# Patient Record
Sex: FEMALE | Race: WHITE | NOT HISPANIC OR LATINO | URBAN - METROPOLITAN AREA
[De-identification: names, ages, dates, MRNs, and addresses within clinical notes are randomized per-mention and may not be internally consistent; named-entity substitution may affect disease eponyms.]

---

## 2022-03-23 ENCOUNTER — APPOINTMENT (OUTPATIENT)
Dept: URBAN - METROPOLITAN AREA SURGERY 18 | Age: 42
Setting detail: DERMATOLOGY
End: 2022-03-28

## 2022-03-23 DIAGNOSIS — L73.8 OTHER SPECIFIED FOLLICULAR DISORDERS: ICD-10-CM

## 2022-03-23 DIAGNOSIS — L81.0 POSTINFLAMMATORY HYPERPIGMENTATION: ICD-10-CM

## 2022-03-23 DIAGNOSIS — L90.5 SCAR CONDITIONS AND FIBROSIS OF SKIN: ICD-10-CM

## 2022-03-23 DIAGNOSIS — Z71.89 OTHER SPECIFIED COUNSELING: ICD-10-CM

## 2022-03-23 PROCEDURE — OTHER REFERRAL: OTHER

## 2022-03-23 PROCEDURE — 99203 OFFICE O/P NEW LOW 30 MIN: CPT

## 2022-03-23 PROCEDURE — OTHER RECOMMENDATIONS: OTHER

## 2022-03-23 PROCEDURE — OTHER COUNSELING: OTHER

## 2022-03-23 PROCEDURE — OTHER DIAGNOSIS COMMENT: OTHER

## 2022-03-23 ASSESSMENT — LOCATION SIMPLE DESCRIPTION DERM
LOCATION SIMPLE: GLABELLA
LOCATION SIMPLE: CHIN

## 2022-03-23 ASSESSMENT — LOCATION DETAILED DESCRIPTION DERM
LOCATION DETAILED: GLABELLA
LOCATION DETAILED: RIGHT CHIN

## 2022-03-23 ASSESSMENT — LOCATION ZONE DERM: LOCATION ZONE: FACE

## 2022-03-23 NOTE — PROCEDURE: DIAGNOSIS COMMENT
Comment: Avoid manipulation in the future.  If she would like to have the area treated, I recommend consultation with [facility] for consultation regarding treatment with laser. Comment: Avoid manipulation in the future.  If she would like to have the area treated, I recommend consultation with [facility] for consultation regarding treatment with laser.

## 2022-03-23 NOTE — PROCEDURE: DIAGNOSIS COMMENT
Comment: Pt requests removal.  Reviewed that excision would result in a scar.  She verbalized understanding.  Schedule a consultation with Dr. Saldaña for further evaluation/treatment.

## 2022-03-23 NOTE — PROCEDURE: RECOMMENDATIONS
Recommendation Preamble: The following recommendations were made during the visit: Refer to [facility] for possible excision. Recommendation Preamble: The following recommendations were made during the visit: Refer to [facility] for possible excision.

## 2022-03-23 NOTE — PROCEDURE: RECOMMENDATIONS
Recommendation Preamble: The following recommendations were made during the visit: If becomes bothersome from a cosmetic standpoint advised to see [facility] for laser evaluation Recommendation Preamble: The following recommendations were made during the visit: If becomes bothersome from a cosmetic standpoint advised to see [facility] for laser evaluation